# Patient Record
Sex: FEMALE | Race: WHITE | NOT HISPANIC OR LATINO | Employment: PART TIME | URBAN - METROPOLITAN AREA
[De-identification: names, ages, dates, MRNs, and addresses within clinical notes are randomized per-mention and may not be internally consistent; named-entity substitution may affect disease eponyms.]

---

## 2017-06-26 ENCOUNTER — GENERIC CONVERSION - ENCOUNTER (OUTPATIENT)
Dept: OTHER | Facility: OTHER | Age: 62
End: 2017-06-26

## 2017-06-29 ENCOUNTER — GENERIC CONVERSION - ENCOUNTER (OUTPATIENT)
Dept: OTHER | Facility: OTHER | Age: 62
End: 2017-06-29

## 2017-10-06 ENCOUNTER — ALLSCRIPTS OFFICE VISIT (OUTPATIENT)
Dept: OTHER | Facility: OTHER | Age: 62
End: 2017-10-06

## 2017-10-06 DIAGNOSIS — Z78.0 ASYMPTOMATIC MENOPAUSAL STATE: ICD-10-CM

## 2017-10-06 DIAGNOSIS — Z12.31 ENCOUNTER FOR SCREENING MAMMOGRAM FOR MALIGNANT NEOPLASM OF BREAST: ICD-10-CM

## 2017-10-07 NOTE — PROGRESS NOTES
Assessment  1  Encounter for routine gynecological examination (V72 31) (Z01 419)   2  Encounter for screening mammogram for malignant neoplasm of breast (V76 12)   (Z12 31)   3  Menopause (627 2) (Z78 0)    Plan  Encounter for screening mammogram for malignant neoplasm of breast    · * MAMMO SCREENING BILATERAL W CAD; Status:Hold For - Scheduling; Requested  for:06Oct2017;    Perform:[facility]; [phone]; Ordered;For:Encounter for screening mammogram for malignant neoplasm of breast; Ordered By:Suzette Richard;  Menopause    · * DXA BONE DENSITY SPINE HIP AND PELVIS; Status:Hold For - Scheduling;  Requested TGX:[id number];    Perform:[facility]; [id number]; Ordered; For:Menopause; Ordered By:Suzette Richard;    Discussion/Summary  cervical cancer screening is current Breast cancer screening: the risks and benefits of breast cancer screening were discussed, self breast exam technique was taught, monthly self breast exam was advised, mammogram is current and mammogram has been ordered  Colorectal cancer screening: the risks and benefits of colorectal cancer screening were discussed and colorectal cancer screening is current  Osteoporosis screening: the risks and benefits of osteoporosis screening were discussed and bone mineral density testing has been ordered  Advice and education were given regarding aerobic exercise, weight bearing exercise, weight loss, calcium supplements and vitamin D supplements  Reviewed mammogram with her  DEXA ordered, discussed weight bearing exercise, Vit D and calcium    The patient has the current Goals: See discussion  The patent has the current Barriers: None  Patient is able to Self-Care  Chief Complaint  1  Visit For: Preventive General Multisystem Exam    History of Present Illness  HPI: Pt here for yearly, no gyn complaints, s/p KANE, BSO for endometriosis, sheh ad an inclusion cyst removed from her sternum earlier this year     GYN HM, Adult Female [facility]: The patient is being seen for a health maintenance evaluation  The last health maintenance visit was 1 year(s) ago  General Health: The patient's health since the last visit is described as good  Lifestyle:  She has weight concerns -She does not exercise regularly -She does not use tobacco    Reproductive health: the patient is postmenopausal    Screening:      Review of Systems    Constitutional: No fever, no chills, feels well, no tiredness, no recent weight gain or loss  ENT: no ear ache, no loss of hearing, no nosebleeds or nasal discharge, no sore throat or hoarseness  Cardiovascular: no complaints of slow or fast heart rate, no chest pain, no palpitations, no leg claudication or lower extremity edema  Respiratory: no complaints of shortness of breath, no wheezing, no dyspnea on exertion, no orthopnea or PND  Breasts: no complaints of breast pain, breast lump or nipple discharge  Gastrointestinal: no complaints of abdominal pain, no constipation, no nausea or diarrhea, no vomiting, no bloody stools  Genitourinary: no complaints of dysuria, no incontinence, no pelvic pain, no dysmenorrhea, no vaginal discharge or abnormal vaginal bleeding  Musculoskeletal: no complaints of arthralgia, no myalgia, no joint swelling or stiffness, no limb pain or swelling  Integumentary: no complaints of skin rash or lesion, no itching or dry skin, no skin wounds  Neurological: no complaints of headache, no confusion, no numbness or tingling, no dizziness or fainting  Active Problems  1  Breast self examination education, encounter for () (P38 77)   2  Encounter for routine gynecological examination () (Z01 419)   3   Encounter for screening mammogram for malignant neoplasm of breast ()   (Z12 31)    Past Medical History   · Breast self examination education, encounter for ( 49) (C00 74)   · History of  0 (R29 89)    The active problems and past medical history were reviewed and updated today  Surgical History   · History of Salpingo-oophorectomy Bilateral   · History of Total Abdominal Hysterectomy    The surgical history was reviewed and updated today  Family History  Mother    · Family history of Congestive Heart Failure   · Family history of Diabetes Mellitus (V18 0)   · Family history of Heart Disease (V17 49)  Father    · Family history of Diabetes Mellitus (V18 0)   · Family history of Heart Disease (V17 49)    The family history was reviewed and updated today  Social History   · Denied: History of Alcohol Use (History)   · Caffeine Use   · Denied: History of Drug Use   · Denied: History of    · Marital History - Currently    · Never A Smoker   · Confucianism Affiliation   · Uses Safety Equipment - Seatbelts  The social history was reviewed and updated today  Current Meds   1  Atorvastatin Calcium 40 MG Oral Tablet; Therapy: (Recorded:2017) to Recorded   2  Bioflavonoid Products TABS; Therapy: ([facility]) to Recorded   3  Calcium TABS; Therapy: ([facility]) to Recorded   4  [employer] Vitamin C TABS; Therapy: ([facility]) to Recorded   5  EQL Vitamin D3 1000 UNIT TABS; Therapy: ([facility]) to Recorded   6  HydroCHLOROthiazide 25 MG Oral Tablet; Therapy: (Recorded:2017) to Recorded    Allergies  1  No Known Drug Allergies  2  Pollen   3  Seasonal    Vitals   Recorded: [id number] 40:46ZQ   Systolic 965, LUE, Sitting   Diastolic 72, LUE, Sitting   Height 5 ft 6 in   Weight 224 lb    BMI Calculated 36 15   BSA Calculated 2 1   LMP HYSTERECTOMY     Physical Exam    Constitutional   General appearance: No acute distress, well appearing and well nourished  Neck   Thyroid: Normal, no thyromegaly  Pulmonary   Auscultation of lungs: Clear to auscultation  Cardiovascular   Auscultation of heart: Normal rate and rhythm, normal S1 and S2, no murmurs      Genitourinary   External genitalia: Normal and no lesions appreciated  Vagina: Normal, no lesions or dryness appreciated  Urethra: Normal     Urethral meatus: Normal     Bladder: Normal, soft, non-tender and no prolapse or masses appreciated  Cervix: Surgically absent  Uterus: Surgically absent  Adnexa/parametria: Surgically absent  Anus, perineum, and rectum: Normal sphincter tone, no masses, and no prolapse  Chest   Breasts: Normal and no dimpling or skin changes noted  Abdomen   Abdomen: Normal, non-tender, and no organomegaly noted  Liver and spleen: No hepatomegaly or splenomegaly  Examination for hernias: No hernias appreciated  Psychiatric   Orientation to person, place, and time: Normal     Mood and affect: Normal        Signatures   Electronically signed by :  Christiano Wilkes DO; Oct  6 2017  8:13AM EST                       (Author)

## 2018-01-10 NOTE — MISCELLANEOUS
Message   Recorded as Task   Date: 06/27/2017 01:48 PM, Created By: Tej Centeno   Task Name: Review Document   Assigned To: Tej Centeno   Regarding Patient: ORQUIDEA PIERSON, Status: In Progress   Comment:    Tej Centeno - 27 Jun 2017 1:48 PM     TASK CREATED  this pt has what seems to be an inclusion cyst in her right breast, does it bother her? radiologist rec warm compresses, can see sx if necessary, benign in appearance  Josefa Urbina - 27 Jun 2017 2:01 PM     TASK IN PROGRESS   Josefa Urbina - 29 Jun 2017 9:26 AM     TASK REPLIED TO: Previously Assigned To Xavier Freitas  pt is having the cyst removed on July 20 with Dr Bartolome Brown    I asked her to make sure they continue to send you the reports           Active Problems    1  Breast self examination education, encounter for (V65 49) (Z71 89)   2  Encounter for routine gynecological examination (V72 31) (Z01 419)   3  Encounter for screening mammogram for malignant neoplasm of breast (V76 12)   (Z12 31)    Current Meds   1  Bioflavonoid Products TABS; Therapy: ([facility] [place]) to Recorded   2  Calcium TABS; Therapy: ([facility]) to Recorded   3  CVS Vitamin C TABS; Therapy: ([facility]) to Recorded   4  EQL Vitamin D3 1000 UNIT TABS; Therapy: ([facility]) to Recorded   5  Niacin 100 MG Oral Tablet; Therapy: ([facility] [place]) to Recorded   6  Cabazon 3 CAPS; Therapy: ([facility] [place]) to Recorded   7  Red Yeast Rice CAPS; Therapy: ([facility] [place]) to Recorded    Allergies    1  No Known Drug Allergies    2  Pollen   3   Seasonal    Signatures   Electronically signed by : Kelley Spaulding, ; Jun 29 2017  9:26AM EST                       (Author)

## 2018-01-12 VITALS
BODY MASS INDEX: 36 KG/M2 | WEIGHT: 224 LBS | DIASTOLIC BLOOD PRESSURE: 72 MMHG | HEIGHT: 66 IN | SYSTOLIC BLOOD PRESSURE: 116 MMHG

## 2018-03-12 ENCOUNTER — TELEPHONE (OUTPATIENT)
Dept: OBGYN CLINIC | Facility: CLINIC | Age: 63
End: 2018-03-12

## 2018-12-12 RX ORDER — HYDROCHLOROTHIAZIDE 25 MG/1
TABLET ORAL
COMMUNITY
End: 2018-12-13

## 2018-12-12 RX ORDER — ATORVASTATIN CALCIUM 40 MG/1
TABLET, FILM COATED ORAL
COMMUNITY

## 2018-12-13 ENCOUNTER — ANNUAL EXAM (OUTPATIENT)
Dept: OBGYN CLINIC | Facility: CLINIC | Age: 63
End: 2018-12-13
Payer: MEDICARE

## 2018-12-13 VITALS
HEIGHT: 66 IN | WEIGHT: 209.8 LBS | BODY MASS INDEX: 33.72 KG/M2 | SYSTOLIC BLOOD PRESSURE: 106 MMHG | DIASTOLIC BLOOD PRESSURE: 68 MMHG

## 2018-12-13 DIAGNOSIS — Z01.419 ENCOUNTER FOR ANNUAL ROUTINE GYNECOLOGICAL EXAMINATION: Primary | ICD-10-CM

## 2018-12-13 DIAGNOSIS — Z12.31 ENCOUNTER FOR SCREENING MAMMOGRAM FOR BREAST CANCER: ICD-10-CM

## 2018-12-13 PROCEDURE — G0101 CA SCREEN;PELVIC/BREAST EXAM: HCPCS | Performed by: OBSTETRICS & GYNECOLOGY

## 2018-12-13 RX ORDER — DILTIAZEM HYDROCHLORIDE 120 MG/1
120 CAPSULE, COATED, EXTENDED RELEASE ORAL DAILY
Refills: 0 | COMMUNITY
Start: 2018-11-21

## 2018-12-13 RX ORDER — PANTOPRAZOLE SODIUM 40 MG/1
TABLET, DELAYED RELEASE ORAL
COMMUNITY
Start: 2018-12-12

## 2018-12-13 NOTE — PROGRESS NOTES
Assessment/Plan:    She does not require a Pap    Mammogram reviewed with her including breast density    Colonoscopy is up to date, her last 1 was 2014 and they told her she could go 10 years although she is seeing GI today  Normal DEXA earlier this year, we reviewed adequate amounts of calcium and vitamin-D and exercise  I did explain that sometimes calcium can cause GI distress and we reviewed the total amount of calcium that she should    No problem-specific Assessment & Plan notes found for this encounter  Diagnoses and all orders for this visit:    Encounter for annual routine gynecological examination    Encounter for screening mammogram for breast cancer  -     Mammo screening bilateral w 3d & cad; Future    Other orders  -     pantoprazole (PROTONIX) 40 mg tablet;   -     diltiazem (CARDIZEM CD) 120 mg 24 hr capsule; Take 120 mg by mouth daily          Subjective:      Patient ID: Jessica Vega is a 70 y o  female  Pt here for yearly  She has no gyn complaints  She has been having issues with chest pain and then abdominal pain  She had a cardiac workup that was negative and her blood pressure medicine was adjusted  This seemed to help but then she had abdominal pain over the weekend and was seen in the ER  She is going later today to see GI  She had a  KANE, BSO for endometriosis at age 35  She had a normal DEXA in March  She also had a normal mammogram in June with breast tissue that is almost all fatty replaced  The following portions of the patient's history were reviewed and updated as appropriate: allergies, current medications, past family history, past medical history, past social history, past surgical history and problem list     Review of Systems   Constitutional: Negative  HENT: Negative  Eyes: Negative  Respiratory: Negative  Cardiovascular: Negative  Gastrointestinal: Negative  Endocrine: Negative  Genitourinary: Negative      Musculoskeletal: Negative  Skin: Negative  Allergic/Immunologic: Negative  Neurological: Negative  Hematological: Negative  Psychiatric/Behavioral: Negative  Objective:      /68 (BP Location: Right arm, Patient Position: Sitting, Cuff Size: Large)   Ht 5' 6" (1 676 m)   Wt 95 2 kg (209 lb 12 8 oz)   BMI 33 86 kg/m²          Physical Exam   Constitutional: She appears well-developed  Neck: No tracheal deviation present  No thyromegaly present  Cardiovascular: Normal rate and regular rhythm  Pulmonary/Chest: Effort normal and breath sounds normal  Right breast exhibits no inverted nipple, no mass, no nipple discharge, no skin change and no tenderness  Left breast exhibits no inverted nipple, no mass, no nipple discharge, no skin change and no tenderness  Breasts are symmetrical    Examined seated and supine   Abdominal: Soft  She exhibits no distension and no mass  There is no tenderness  Genitourinary: Rectum normal and vagina normal  No labial fusion  There is no rash, tenderness, lesion or injury on the right labia  There is no rash, tenderness, lesion or injury on the left labia  Genitourinary Comments: Uterus, cervix and adnexa are surgically absent   Vitals reviewed

## 2019-07-11 ENCOUNTER — TELEPHONE (OUTPATIENT)
Dept: OBGYN CLINIC | Facility: CLINIC | Age: 64
End: 2019-07-11

## 2019-08-28 DIAGNOSIS — Z12.31 ENCOUNTER FOR SCREENING MAMMOGRAM FOR BREAST CANCER: ICD-10-CM

## 2021-03-10 DIAGNOSIS — Z23 ENCOUNTER FOR IMMUNIZATION: ICD-10-CM
